# Patient Record
Sex: MALE | Race: BLACK OR AFRICAN AMERICAN | URBAN - METROPOLITAN AREA
[De-identification: names, ages, dates, MRNs, and addresses within clinical notes are randomized per-mention and may not be internally consistent; named-entity substitution may affect disease eponyms.]

---

## 2021-04-21 ENCOUNTER — HOSPITAL ENCOUNTER (OUTPATIENT)
Dept: MEDSURG UNIT | Facility: HOSPITAL | Age: 31
End: 2021-04-23
Attending: INTERNAL MEDICINE | Admitting: INTERNAL MEDICINE

## 2021-04-21 LAB
ABS NEUT (OLG): 24.95 X10(3)/MCL (ref 2.1–9.2)
ALBUMIN SERPL-MCNC: 3.3 GM/DL (ref 3.5–5)
ALBUMIN/GLOB SERPL: 0.9 RATIO (ref 1.1–2)
ALP SERPL-CCNC: 72 UNIT/L (ref 40–150)
ALT SERPL-CCNC: 25 UNIT/L (ref 0–55)
APPEARANCE, UA: CLEAR
AST SERPL-CCNC: 21 UNIT/L (ref 5–34)
BACTERIA #/AREA URNS AUTO: ABNORMAL /HPF
BASOPHILS NFR BLD MANUAL: 0 %
BILIRUB SERPL-MCNC: 1.4 MG/DL
BILIRUB UR QL STRIP: 0.5 MG/DL
BILIRUBIN DIRECT+TOT PNL SERPL-MCNC: 0.6 MG/DL (ref 0–0.5)
BILIRUBIN DIRECT+TOT PNL SERPL-MCNC: 0.8 MG/DL (ref 0–0.8)
BUN SERPL-MCNC: 13 MG/DL (ref 8.9–20.6)
CALCIUM SERPL-MCNC: 9.3 MG/DL (ref 8.4–10.2)
CHLORIDE SERPL-SCNC: 105 MMOL/L (ref 98–107)
CO2 SERPL-SCNC: 24 MMOL/L (ref 22–29)
COLOR UR: ABNORMAL
CREAT SERPL-MCNC: 1.28 MG/DL (ref 0.73–1.18)
EOSINOPHIL NFR BLD MANUAL: 1 %
ERYTHROCYTE [DISTWIDTH] IN BLOOD BY AUTOMATED COUNT: 11.3 % (ref 11.5–14.5)
GLOBULIN SER-MCNC: 3.8 GM/DL (ref 2.4–3.5)
GLUCOSE (UA): NEGATIVE
GLUCOSE SERPL-MCNC: 124 MG/DL (ref 74–100)
GRANULOCYTES NFR BLD MANUAL: 95 % (ref 43–75)
HCT VFR BLD AUTO: 43.5 % (ref 40–51)
HGB BLD-MCNC: 14.4 GM/DL (ref 13.5–17.5)
HGB UR QL STRIP: NEGATIVE
HYALINE CASTS #/AREA URNS LPF: ABNORMAL /LPF
KETONES UR QL STRIP: ABNORMAL
LACTATE SERPL-SCNC: 1.2 MMOL/L (ref 0.5–2.2)
LEUKOCYTE ESTERASE UR QL STRIP: 75 LEU/UL
LIPASE SERPL-CCNC: 11 U/L
LYMPHOCYTES NFR BLD MANUAL: 3 % (ref 20.5–51.1)
MCH RBC QN AUTO: 28.9 PG (ref 26–34)
MCHC RBC AUTO-ENTMCNC: 33.1 GM/DL (ref 31–37)
MCV RBC AUTO: 87.3 FL (ref 80–100)
MONOCYTES NFR BLD MANUAL: 1 % (ref 2–9)
MUCOUS THREADS URNS QL MICRO: ABNORMAL
NITRITE UR QL STRIP: NEGATIVE
PH UR STRIP: 7 [PH] (ref 4.5–8)
PLATELET # BLD AUTO: 192 X10(3)/MCL (ref 130–400)
PLATELET # BLD EST: ADEQUATE 10*3/UL
PMV BLD AUTO: 10 FL (ref 7.4–10.4)
POTASSIUM SERPL-SCNC: 3.7 MMOL/L (ref 3.5–5.1)
PROT SERPL-MCNC: 7.1 GM/DL (ref 6.4–8.3)
PROT UR QL STRIP: 100 MG/DL
RBC # BLD AUTO: 4.98 X10(6)/MCL (ref 4.5–5.9)
RBC #/AREA URNS AUTO: ABNORMAL /HPF
RBC MORPH BLD: NORMAL
SARS-COV-2 AG RESP QL IA.RAPID: NEGATIVE
SODIUM SERPL-SCNC: 138 MMOL/L (ref 136–145)
SP GR UR STRIP: 1.04 (ref 1–1.03)
SQUAMOUS #/AREA URNS LPF: >100 /LPF
UROBILINOGEN UR STRIP-ACNC: >12 MG/DL
WBC # SPEC AUTO: 26.8 X10(3)/MCL (ref 4.5–11)
WBC #/AREA URNS AUTO: ABNORMAL /HPF

## 2021-04-22 LAB
ABS NEUT (OLG): 20.06 X10(3)/MCL (ref 2.1–9.2)
ALBUMIN SERPL-MCNC: 2.7 GM/DL (ref 3.5–5)
ALBUMIN/GLOB SERPL: 0.8 RATIO (ref 1.1–2)
ALP SERPL-CCNC: 66 UNIT/L (ref 40–150)
ALT SERPL-CCNC: 23 UNIT/L (ref 0–55)
AST SERPL-CCNC: 17 UNIT/L (ref 5–34)
BASOPHILS # BLD AUTO: 0 X10(3)/MCL (ref 0–0.2)
BASOPHILS NFR BLD AUTO: 0 %
BILIRUB SERPL-MCNC: 1.2 MG/DL
BILIRUBIN DIRECT+TOT PNL SERPL-MCNC: 0.5 MG/DL (ref 0–0.5)
BILIRUBIN DIRECT+TOT PNL SERPL-MCNC: 0.7 MG/DL (ref 0–0.8)
BUN SERPL-MCNC: 11.7 MG/DL (ref 8.9–20.6)
CALCIUM SERPL-MCNC: 8.8 MG/DL (ref 8.4–10.2)
CHLORIDE SERPL-SCNC: 106 MMOL/L (ref 98–107)
CHOLEST SERPL-MCNC: 126 MG/DL
CHOLEST/HDLC SERPL: 5 {RATIO} (ref 0–5)
CO2 SERPL-SCNC: 23 MMOL/L (ref 22–29)
CREAT SERPL-MCNC: 1.03 MG/DL (ref 0.73–1.18)
EOSINOPHIL # BLD AUTO: 0.5 X10(3)/MCL (ref 0–0.9)
EOSINOPHIL NFR BLD AUTO: 2 %
ERYTHROCYTE [DISTWIDTH] IN BLOOD BY AUTOMATED COUNT: 11.4 % (ref 11.5–14.5)
EST. AVERAGE GLUCOSE BLD GHB EST-MCNC: 105.4 MG/DL
GLOBULIN SER-MCNC: 3.5 GM/DL (ref 2.4–3.5)
GLUCOSE SERPL-MCNC: 96 MG/DL (ref 74–100)
HBA1C MFR BLD: 5.3 %
HCT VFR BLD AUTO: 40 % (ref 40–51)
HDLC SERPL-MCNC: 27 MG/DL (ref 35–60)
HGB BLD-MCNC: 13.2 GM/DL (ref 13.5–17.5)
HIV 1+2 AB+HIV1 P24 AG SERPL QL IA: NONREACTIVE
IMM GRANULOCYTES # BLD AUTO: 0.25 10*3/UL
IMM GRANULOCYTES NFR BLD AUTO: 1 %
LDLC SERPL CALC-MCNC: 74 MG/DL (ref 50–140)
LYMPHOCYTES # BLD AUTO: 1 X10(3)/MCL (ref 0.6–4.6)
LYMPHOCYTES NFR BLD AUTO: 4 %
MAGNESIUM SERPL-MCNC: 1.6 MG/DL (ref 1.6–2.6)
MCH RBC QN AUTO: 29.2 PG (ref 26–34)
MCHC RBC AUTO-ENTMCNC: 33 GM/DL (ref 31–37)
MCV RBC AUTO: 88.5 FL (ref 80–100)
MONOCYTES # BLD AUTO: 0.5 X10(3)/MCL (ref 0.1–1.3)
MONOCYTES NFR BLD AUTO: 2 %
NEUTROPHILS # BLD AUTO: 20.06 X10(3)/MCL (ref 2.1–9.2)
NEUTROPHILS NFR BLD AUTO: 90 %
PH STL: 8 [PH]
PHOSPHATE SERPL-MCNC: 1.7 MG/DL (ref 2.3–4.7)
PLATELET # BLD AUTO: 171 X10(3)/MCL (ref 130–400)
PMV BLD AUTO: 10.9 FL (ref 7.4–10.4)
POTASSIUM SERPL-SCNC: 3.5 MMOL/L (ref 3.5–5.1)
PROT SERPL-MCNC: 6.2 GM/DL (ref 6.4–8.3)
RBC # BLD AUTO: 4.52 X10(6)/MCL (ref 4.5–5.9)
SODIUM SERPL-SCNC: 138 MMOL/L (ref 136–145)
TRIGL SERPL-MCNC: 127 MG/DL (ref 34–140)
VLDLC SERPL CALC-MCNC: 25 MG/DL
WBC # SPEC AUTO: 22.3 X10(3)/MCL (ref 4.5–11)

## 2021-04-23 LAB
ABS NEUT (OLG): 12.51 X10(3)/MCL (ref 2.1–9.2)
ALBUMIN SERPL-MCNC: 2.6 GM/DL (ref 3.5–5)
ALBUMIN/GLOB SERPL: 0.7 RATIO (ref 1.1–2)
ALP SERPL-CCNC: 66 UNIT/L (ref 40–150)
ALT SERPL-CCNC: 19 UNIT/L (ref 0–55)
AMPHET UR QL SCN: NEGATIVE
AST SERPL-CCNC: 16 UNIT/L (ref 5–34)
BARBITURATE SCN PRESENT UR: NEGATIVE
BASOPHILS # BLD AUTO: 0 X10(3)/MCL (ref 0–0.2)
BASOPHILS NFR BLD AUTO: 0 %
BENZODIAZ UR QL SCN: NEGATIVE
BILIRUB SERPL-MCNC: 0.5 MG/DL
BILIRUBIN DIRECT+TOT PNL SERPL-MCNC: 0.2 MG/DL (ref 0–0.5)
BILIRUBIN DIRECT+TOT PNL SERPL-MCNC: 0.3 MG/DL (ref 0–0.8)
BUN SERPL-MCNC: 8.9 MG/DL (ref 8.9–20.6)
CALCIUM SERPL-MCNC: 8.8 MG/DL (ref 8.4–10.2)
CANNABINOIDS UR QL SCN: NEGATIVE
CHLORIDE SERPL-SCNC: 107 MMOL/L (ref 98–107)
CO2 SERPL-SCNC: 24 MMOL/L (ref 22–29)
COCAINE UR QL SCN: NEGATIVE
CREAT SERPL-MCNC: 0.99 MG/DL (ref 0.73–1.18)
EOSINOPHIL # BLD AUTO: 0.9 X10(3)/MCL (ref 0–0.9)
EOSINOPHIL NFR BLD AUTO: 6 %
ERYTHROCYTE [DISTWIDTH] IN BLOOD BY AUTOMATED COUNT: 11.5 % (ref 11.5–14.5)
EST CREAT CLEARANCE SER (OHS): 106.76 ML/MIN
GLOBULIN SER-MCNC: 3.6 GM/DL (ref 2.4–3.5)
GLUCOSE SERPL-MCNC: 101 MG/DL (ref 74–100)
HCT VFR BLD AUTO: 39.2 % (ref 40–51)
HGB BLD-MCNC: 12.8 GM/DL (ref 13.5–17.5)
IMM GRANULOCYTES # BLD AUTO: 0.05 10*3/UL
IMM GRANULOCYTES NFR BLD AUTO: 0 %
LYMPHOCYTES # BLD AUTO: 1.6 X10(3)/MCL (ref 0.6–4.6)
LYMPHOCYTES NFR BLD AUTO: 10 %
MAGNESIUM SERPL-MCNC: 1.9 MG/DL (ref 1.6–2.6)
MCH RBC QN AUTO: 29 PG (ref 26–34)
MCHC RBC AUTO-ENTMCNC: 32.7 GM/DL (ref 31–37)
MCV RBC AUTO: 88.7 FL (ref 80–100)
MONOCYTES # BLD AUTO: 0.5 X10(3)/MCL (ref 0.1–1.3)
MONOCYTES NFR BLD AUTO: 3 %
NEUTROPHILS # BLD AUTO: 12.51 X10(3)/MCL (ref 2.1–9.2)
NEUTROPHILS NFR BLD AUTO: 81 %
OPIATES UR QL SCN: NEGATIVE
PCP UR QL: NEGATIVE
PH UR STRIP.AUTO: 7.5 [PH] (ref 3–11)
PHOSPHATE SERPL-MCNC: 2.7 MG/DL (ref 2.3–4.7)
PLATELET # BLD AUTO: 170 X10(3)/MCL (ref 130–400)
PMV BLD AUTO: 10.7 FL (ref 7.4–10.4)
POTASSIUM SERPL-SCNC: 3.9 MMOL/L (ref 3.5–5.1)
PROT SERPL-MCNC: 6.2 GM/DL (ref 6.4–8.3)
RBC # BLD AUTO: 4.42 X10(6)/MCL (ref 4.5–5.9)
SODIUM SERPL-SCNC: 137 MMOL/L (ref 136–145)
T PALLIDUM AB SER QL: NONREACTIVE
WBC # SPEC AUTO: 15.5 X10(3)/MCL (ref 4.5–11)

## 2021-04-24 LAB — FINAL CULTURE: NORMAL

## 2021-04-25 LAB — FINAL CULTURE: NORMAL

## 2021-04-27 LAB
FINAL CULTURE: NORMAL
FINAL CULTURE: NORMAL

## 2021-11-03 ENCOUNTER — HISTORICAL (OUTPATIENT)
Dept: ADMINISTRATIVE | Facility: HOSPITAL | Age: 31
End: 2021-11-03

## 2021-11-10 ENCOUNTER — HISTORICAL (OUTPATIENT)
Dept: ADMINISTRATIVE | Facility: HOSPITAL | Age: 31
End: 2021-11-10

## 2022-04-10 ENCOUNTER — HISTORICAL (OUTPATIENT)
Dept: ADMINISTRATIVE | Facility: HOSPITAL | Age: 32
End: 2022-04-10

## 2022-04-26 VITALS
OXYGEN SATURATION: 96 % | WEIGHT: 218.06 LBS | HEIGHT: 69 IN | SYSTOLIC BLOOD PRESSURE: 133 MMHG | BODY MASS INDEX: 32.3 KG/M2 | DIASTOLIC BLOOD PRESSURE: 75 MMHG

## 2022-04-30 NOTE — ED PROVIDER NOTES
Patient:   Joe Bolaños            MRN: [id number]            FIN: [id number]               Age:   31 years     Sex:  Male     :  1990   Associated Diagnoses:   Abdominal pain, generalized; Enteritis   Author:   Franco Zapata MD      Basic Information   Time seen: Immediately upon arrival.   History source: Patient.   Arrival mode: Private vehicle.   History limitation: None.   Additional information: Chief Complaint from Nursing Triage Note : Chief Complaint   2021 18:36 CDT      Chief Complaint           pt here for n/v since yesterday, pt reports fever  .   Provider/Visit info:   Time Seen:  Franco Zapata MD / 2021 18:50  .   History of Present Illness   The patient presents with abdominal pain.  The onset was 2  days ago.  The course/duration of symptoms is constant.  The character of symptoms is crampy.  The degree at onset was moderate.  The Location of pain at onset was diffuse and abdominal.  The degree at present is moderate.  The Location of pain at present is diffuse and abdominal.  Radiating pain: none. The exacerbating factor is none.  The relieving factor is none.  Therapy today: over the counter medications including Motrin.  Risk factors consist of none.  Associated symptoms: nausea, vomiting, diarrhea and fever.  Additional history: sexual history: non-contributory.  States got sick after eating some chinnesse food.        Review of Systems   Constitutional symptoms:  No fever, no chills, no sweats, no weakness.    Skin symptoms:  No rash, no lesion.    Eye symptoms:  No recent vision problems,    Respiratory symptoms:  No shortness of breath, no orthopnea, no cough, no sputum production.    Cardiovascular symptoms:  No chest pain, no palpitations, no tachycardia, no syncope, no diaphoresis, no peripheral edema.    Gastrointestinal symptoms:  Negative except as documented in HPI, no constipation, no rectal bleeding.    Genitourinary  symptoms:  No dysuria, no hematuria, no testicular pain.    Musculoskeletal symptoms:  No back pain, no Joint pain.    Neurologic symptoms:  No headache, no dizziness.    Allergy/immunologic symptoms:  Negative except as documented in HPI.             Additional review of systems information: All other systems reviewed and otherwise negative.      Health Status   Allergies:    Allergic Reactions (Selected)  No Known Allergies,    Allergies (1) Active Reaction  No Known Allergies None Documented  .   Medications:  (Selected)   Inpatient Medications  Ordered  Pepcid (for IV Push): 20 mg, form: Injection, IV Slow, Once, first dose 04/21/21 19:00:00 CDT, stop date 04/21/21 19:00:00 CDT  Zofran 2 mg/mL injectable solution: 4 mg, form: Injection, IV Push, Once, first dose 04/21/21 18:55:00 CDT, stop date 04/21/21 18:55:00 CDT, STAT  Prescriptions  Prescribed  diclofenac sodium 75 mg oral delayed release tablet: 75 mg = 1 tab(s), Oral, BID, PRN PRN as needed for pain, # 20 tab(s), 0 Refill(s)  Documented Medications  Documented   mg oral tablet: 0 Refill(s).      Past Medical/ Family/ Social History   Medical history: Negative.   Surgical history: Negative.   Family history:    No family history items have been selected or recorded..   Social history:    Social & Psychosocial Habits    Tobacco  07/04/2016  Concerns about tobacco use in household: No    11/02/2018  Use: Never smoker    Patient Wants Consult For Cessation Counseling N/A    04/21/2021  Use: Never (less than 100 in l    Patient Wants Consult For Cessation Counseling No    Abuse/Neglect  04/21/2021  SHX Any signs of abuse or neglect No  , Alcohol use: Denies, Tobacco use: Denies, Drug use: Denies.   Problem list:    Active Problems (1)  Obesity   .      Physical Examination               Vital Signs   Vital Signs   4/21/2021 18:36 CDT      Temperature Oral          37 DegC                             Temperature Oral (calculated)             98.60 DegF                              Peripheral Pulse Rate     99 bpm                             Respiratory Rate          17 br/min                             SpO2                      99 %                             Oxygen Therapy            Room air                             Systolic Blood Pressure   125 mmHg                             Diastolic Blood Pressure  81 mmHg  .      Vital Signs (last 24 hrs)_____  Last Charted___________  Temp Oral     37 DegC  (APR 21 18:36)  Heart Rate Peripheral   99 bpm  (APR 21 18:36)  Resp Rate         17 br/min  (APR 21 18:36)  SBP      125 mmHg  (APR 21 18:36)  DBP      81 mmHg  (APR 21 18:36)  SpO2      99 %  (APR 21 18:36)  Weight      95 kg  (APR 21 18:36)  Height      175 cm  (APR 21 18:36)  BMI      31.02  (APR 21 18:36)  .   Measurements   4/21/2021 18:36 CDT      Weight Dosing             95 kg                             Weight Measured           95 kg                             Weight Measured and Calculated in Lbs     209.44 lb                             Height/Length Dosing      175 cm                             Height/Length Measured    175 cm                             Body Mass Index Measured  31.02 kg/m2  .   Basic Oxygen Information   4/21/2021 18:36 CDT      SpO2                      99 %                             Oxygen Therapy            Room air  .   General:  Alert, no acute distress.    Skin:  Warm, dry, pink, intact, no pallor.    Head:  Normocephalic.   Neck:  Supple, no JVD.    Eye:  Pupils are equal, round and reactive to light, normal conjunctiva.    Ears, nose, mouth and throat:  Oral mucosa moist.   Cardiovascular:  Regular rate and rhythm, No murmur, Normal peripheral perfusion, No edema.    Respiratory:  Lungs are clear to auscultation, respirations are non-labored, breath sounds are equal, Symmetrical chest wall expansion.    Gastrointestinal:  Soft, Non distended, Normal bowel sounds, No organomegaly, Tenderness: Mild, right lower quadrant,  Guarding: Negative, Rebound: Negative.    Back:  Nontender, Normal range of motion.    Musculoskeletal:  Normal ROM, normal strength, no swelling.    Neurological:  Alert and oriented to person, place, time, and situation, No focal neurological deficit observed, normal motor observed, normal speech observed, normal coordination observed.    Psychiatric:  Cooperative, appropriate mood & affect.       Medical Decision Making   Documents reviewed:  Emergency department nurses' notes.   Results review:  Lab results : Lab View   4/21/2021 19:00 CDT      CO2                       24 mmol/L                             BUN                       13.0 mg/dL                             Creatinine                1.28 mg/dL  HI                             Bili Total                1.4 mg/dL                             AST                       21 unit/L                             ALT                       25 unit/L                             Lipase Lvl                11 U/L                             WBC                       26.8 x10(3)/mcL  HI                             Hgb                       14.4 gm/dL                             Hct                       43.5 %                             Platelet                  192 x10(3)/mcL                             Segs Man                  95 %  HI                             Lymph Man                 3.0 %  LOW  , Lab results : [facility]   4/21/2021 18:55 CDT      UA Spec Grav              1.041  HI                             UA pH                     7.0                             UA Protein                100 mg/dL                             UA Leuk Est               75 Munir/uL                             UA WBC Interp             26-50 /HPF                             UA RBC Interp             6-10 /HPF                             UA Bact Interp            None Seen /HPF                             UA Squam Epi Interp       >100 /LPF  , Lab results : [facility]   4/21/2021  21:10 CDT      Lactic Acid Lvl           1.20 mmol/L    4/21/2021 20:58 CDT      COVID-19 Rapid            NEGATIVE  ,    No qualifying data available.    Radiology results:  Reported at  4/21/2021 20:58:00, Computed tomography, reviewed radiologist's report, Rad Results (ST)  < 12 hrs   Accession: [id number]  Order: CT Abdomen and Pelvis W Contrast  Report Dt/Tm: 04/21/2021 20:39  Report:   EXAMINATION  CT Abdomen and Pelvis W Contrast     INDICATION  Persistent nausea/vomiting, abdominal pain, fever     Comparison: No similar modality comparisons are available at the time  of exam interpretation.     TECHNIQUE  Helical-acquisition CT images were obtained following the intravenous  administration of iodinated contrast media. Enteric contrast was not  utilized.  Multiplanar reformats were accomplished by a CT technologist at a  separate workstation and pushed to PACS for physician review.     Automated tube current modulation and/or weight-based exposure dosing  is utilized, when appropriate, to reach lowest reasonably achievable  exposure rate.     FINDINGS  Images were reviewed in soft tissue, lung, and bone windows.     Exam quality: adequate     Lines/tubes: None visualized.     Visualized cardiac structures, lung bases, and esophagus are without  suspicious abnormality. Trace layering bilateral pleural fluid is  noted, with no loculated component appreciated. There is mild basilar  atelectasis.     The gallbladder and bile ducts are unremarkable.  The liver is mildly prominent with diffuse hypoattenuation of the  parenchyma, suggestive of fat infiltration. There is a punctate  splenic calcification, likely representing granuloma.  The upper abdominal solid organs are otherwise unremarkable. There is  no evidence of radiodense urolithiasis or distal obstructive uropathy.     The stomach is without acute or suspicious focal abnormality. The  duodenum is unremarkable.  No significant small bowel dilatation  suggestive of high-grade  mechanical obstruction is appreciated. Number, there are several  slightly prominent fluid-filled loops of bowel throughout the left  upper and mid abdomen, as well as associated areas of circumferential  mural thickening through the proximal jejunum. The distal jejunum and  ileum are unremarkable.  The appendix is normal in appearance. No suspicious focal abnormality  of the colon is appreciated. There is irregular fat infiltration of  the cecum and ascending colon wall, which can be seen as chronic  sequela of large bowel inflammatory process.  No free intra-abdominal air or fluid is evident. There are no  drainable collections.     The urinary bladder is nondistended, limiting overall assessment.  However, there is notable diffuse thickening of the bladder wall,  greater than expected for degree of luminal distention.  The prostate is unremarkable for CT assessment.     Aortoiliac tapering is normal through the abdomen and pelvis, with no  aneurysmal dilatation or other focal abnormality. The systemic and  portal venous structures are normal in appearance. There is  incidentally noted normal-variant anatomy with circumaortic left renal  vein configuration.  Scattered reactive-sized mesenteric lymph nodes are present. No  pathologic obi enlargement or necrotic adenopathy is appreciated.     The body wall subcutaneous tissues and underlying musculature are  unremarkable.  There is no acute osseous displacement or destructive focal bone  lesion.     IMPRESSION  1.  Fluid-filled loops of small bowel with mural thickening and  reactive appearing mesenteric lymph nodes are nonspecific but may  reflect changes of acute enteritis.  2.  Fat infiltration of the proximal colon wall suggests chronic  inflammatory sequela, with no focal large bowel abnormality  identified.  3.  Diffusely thickened and irregular appearance of the urinary  bladder wall may reflect changes of cystitis, needing  correlation with  pertinent clinical/laboratory details.  4.  Additional nonacute secondary findings discussed above.    .       Reexamination/ Reevaluation   Time: 4/21/2021 22:00:00 .   Vital signs   Basic Oxygen Information   4/21/2021 18:36 CDT      SpO2                      99 %                             Oxygen Therapy            Room air     Assessment: exam unchanged, Due to leukocytosis and no improving will admit overnight for serial abdominal exams and clinical improvement..      Impression and Plan   Diagnosis   Abdominal pain, generalized (DGI87-YN R10.84)   Enteritis (PES37-LW K52.9)      Calls-Consults   -  4/21/2021 22:07:00 , Medicine, consult.    Plan   Condition: Stable.    Disposition: Admit time  4/21/2021 22:07:00, Place in Observation Unit.    Counseled: Patient, Regarding diagnosis, Regarding diagnostic results, Regarding treatment plan, Patient indicated understanding of instructions.

## 2022-05-02 NOTE — HISTORICAL [facility] CERNER
This is a historical note converted from Roberto. Formatting and pictures may have been removed.  Please reference Roberto for original formatting and attached multimedia. Chief Complaint  Referral for L elbow dislocation--10/20/21  History of Present Illness  Patient is a 31-year-old male right-hand-dominant presents to clinic for evaluation for left elbow injury.? Patient sustained a left elbow dislocation?about 2 weeks ago when he fell onto outstretched hand.? He was seen in outside facility where his elbow was reduced and he was placed in splint.??He presents today for evaluation.? Patient has?moderate elbow pain and stiffness.? 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 Non smoker  ?  The patient tells me that he had a?previous left elbow dislocation approximately 10 years ago.? At that time he fell off of a horse resulting in injury.? He was treated nonoperatively in an outside facility.? Reports his last x-rays of the elbow were approximately 7 or 8 years ago and he does not have access to them.? He is unclear if he had a?medial?epicondyle fracture at that time.  Review of Systems  Constitutional:?no fever, fatigue, weakness  Eye:?no vision loss, eye redness, drainage, or pain  ENMT:?no sore throat, ear pain, sinus pain/congestion, nasal congestion/drainage  Respiratory:?no cough, no wheezing, no shortness of breath  Cardiovascular:?no chest pain, no palpitations, no edema  Gastrointestinal:?no nausea, vomiting, or diarrhea. No abdominal pain  ?  ?  Physical Exam  Vitals & Measurements  HT:?175.00?cm? WT:?100.800?kg? BMI:?32.91?  Left upper extremity:  No open wounds, abrasions lacerations  Tenderness is noted over the medial elbow  Bony prominence is noted over the?medial elbow  Range of motion?  Intact median/ulnar/radial/anterior/posterior nerve distribution  Sensation intact median/ulnar/radial nerve distribution  Pulses 2+  Assessment/Plan  1.?Fracture, humerus, medial  epicondyle?S42.443A  ?Patient is a 31-year-old male who presents to clinic for evaluation for left?medial epicondyle fracture. ?He is now 2 weeks out from his injury. ?We have discussed the patient risk, benefits and alternatives for operative fixation of this injury.? Elected to hold off on operative fixation of this injury. ?Will return in 1 week for repeat?clinical examination  ?  ATTENDING ADDENDUM  ?  Joe Bolaños?was evaluated at the time of the encounter with?Dr Vilchis.? HPI, PE and treatment plan was reviewed.? Treatment plan was reasonable and necessary.??Imaging was reviewed at the time of visit.??  ?  I long discussion with patient regarding his injury. ?Is unclear as to whether this is an acute or chronic?medial?epicondyle fracture.? There is minimal swelling, bruising, and tenderness over the?medial epicondyle fracture?make me believe this is a chronic finding however the patient does report that he does not believe the new bump?representing his fracture fragment was present on his elbow prior to his most recent elbow dislocation.  ?  Patient main goals are return to work as soon as possible.? I reviewed the operative and nonoperative?options with him.? He is already begun working on range of motion and strengthening exercises for the left elbow?prior to seeing us in clinic.  ?  I recommended he undergo open reduction and fixation of his medial epicondyle fracture.? The patient tells me he would like to attempt nonoperative treatment?and try to get back to work?as soon as possible.? He tells me that if he is unable to return to his regular duties he will return to see us?for further surgical discussion. ?I will make an appointment to see his back in 1 to 2 weeks?for repeat clinical assessment?and determine what the next best step will be for him.  Ordered:  Clinic Follow up, *Est. 11/10/21 3:00:00 CST, Order for future visit, Fracture, humerus, medial epicondyle, [facility]  Clinic Follow-up  PRN, 11/03/21 15:22:00 CDT  Office/Outpatient Visit Level 4 New [id number] PC, Fracture, humerus, medial epicondyle, [facility], 11/03/21 15:22:00 CDT  ?   Medications  diclofenac sodium 75 mg oral delayed release tablet, 75 mg= 1 tab(s), Oral, BID, PRN  Norco 10 mg-325 mg oral tablet, 1 tab(s), Oral, q6hr, PRN  Diagnostic Results  On independent review of left elbow radiographs?elbow is concentrically reduced?with a fracture fragment evident?from the medial epicondyle.? Unclear if this is acute or chronic.

## 2022-05-02 NOTE — HISTORICAL [facility] C[facility]ER
This is a historical note converted from Roberto. Formatting and pictures may have been removed.  Please reference Roberto for original formatting and attached multimedia. Chief Complaint  f/u for left elbow  History of Present Illness  Patient is a 31-year-old male right-hand-dominant presents to clinic for follow-up for left elbow injury.? Patient sustained a left elbow dislocation about 3 weeks ago, he was seen in outside facility elbow reduced and placed in splint. ?He presented to us with?a left medial epicondyle fracture?unclear chronicity as the patient states that he had a prior?elbow injury before this?episode. Valuation today patient states that he has been working on range of motion of his left elbow. He states that?his elbow feels stronger.?He states that he is able to lift his son up who weighs about 30 pounds. He has occasional discomfort in his left elbow?but states that he would like to go back to work?and see if he can get it stronger. Denies any additional trauma or injury. Denies any further episodes of dislocation.  Review of Systems  Constitutional:?no fever, fatigue, weakness  Eye:?no vision loss, eye redness, drainage, or pain  ENMT:?no sore throat, ear pain, sinus pain/congestion, nasal congestion/drainage  Respiratory:?no cough, no wheezing, no shortness of breath  Cardiovascular:?no chest pain, no palpitations, no edema  Gastrointestinal:?no nausea, vomiting, or diarrhea. No abdominal pain  Physical Exam  Vitals & Measurements  T:?36.8? ?C (Oral)? HR:?80(Peripheral)? RR:?18? BP:?133/75? SpO2:?96%?  HT:?175.00?cm? WT:?98.900?kg? BMI:?32.29?  Left upper extremity:  No open wounds, abrasions lacerations  Mild tenderness is noted over the medial elbow  Bony prominence is noted over the?medial elbow  Range of motion?5-140  Stable varus/valgus stress  Intact median/ulnar/radial/anterior/posterior nerve distribution  Sensation intact median/ulnar/radial nerve distribution  Pulses  2+  Assessment/Plan  Fracture of distal end of left humerus?S42.402A  ?Patient is a 31-year-old male history of left elbow injury with a?left medial epicondyle fracture of unknown chronicity.?On evaluation today there is no change in position of the?fracture fragment.? We continue to recommend?ORIF of the?fragment.?Patient is improving clinically and would like to avoid surgical treatment. After thorough discussion with the patient regarding the?risk, benefits?and alternatives of surgery?would like to?continue nonoperative treatment at this time.  Follow-up?in clinic in 1 month for repeat clinical evaluation or sooner if he has any issues or concerns.  ?  ATTENDING ADDENDUM  ?  Joe Bolaños?was evaluated at the time of the encounter with?Dr Hernandez.? HPI, PE and treatment plan was reviewed.? Treatment plan was reasonable and necessary.??Imaging was reviewed at the time of visit.??  ?  Ordered:  XR Elbow Left Minimum 3 Views, Routine, 11/10/21 13:52:00 CST, None, Ambulatory, Rad Type, Fracture of distal end of left humerus, Not Scheduled, 11/10/21 13:52:00 CST  ?   Medications  diclofenac sodium 75 mg oral delayed release tablet, 75 mg= 1 tab(s), Oral, BID, PRN  Norco 10 mg-325 mg oral tablet, 1 tab(s), Oral, q6hr, PRN  Diagnostic Results  Independent review of left elbow radiographs?primary epicondyle fracture fragment is visualized again?with no change in position as compared to prior radiographs.?This Is consistent with?a chronic?fracture.